# Patient Record
Sex: FEMALE | Race: WHITE | NOT HISPANIC OR LATINO | URBAN - METROPOLITAN AREA
[De-identification: names, ages, dates, MRNs, and addresses within clinical notes are randomized per-mention and may not be internally consistent; named-entity substitution may affect disease eponyms.]

---

## 2017-01-23 ENCOUNTER — APPOINTMENT (RX ONLY)
Dept: URBAN - METROPOLITAN AREA CLINIC 343 | Facility: CLINIC | Age: 62
Setting detail: DERMATOLOGY
End: 2017-01-23

## 2017-01-23 DIAGNOSIS — L57.0 ACTINIC KERATOSIS: ICD-10-CM

## 2017-01-23 DIAGNOSIS — L81.4 OTHER MELANIN HYPERPIGMENTATION: ICD-10-CM

## 2017-01-23 PROBLEM — H91.90 UNSPECIFIED HEARING LOSS, UNSPECIFIED EAR: Status: ACTIVE | Noted: 2017-01-23

## 2017-01-23 PROBLEM — Z85.820 PERSONAL HISTORY OF MALIGNANT MELANOMA OF SKIN: Status: ACTIVE | Noted: 2017-01-23

## 2017-01-23 PROBLEM — M12.9 ARTHROPATHY, UNSPECIFIED: Status: ACTIVE | Noted: 2017-01-23

## 2017-01-23 PROCEDURE — 99202 OFFICE O/P NEW SF 15 MIN: CPT | Mod: 25

## 2017-01-23 PROCEDURE — 17000 DESTRUCT PREMALG LESION: CPT

## 2017-01-23 PROCEDURE — ? COUNSELING

## 2017-01-23 PROCEDURE — ? LIQUID NITROGEN

## 2017-01-23 ASSESSMENT — LOCATION DETAILED DESCRIPTION DERM
LOCATION DETAILED: LEFT DISTAL CALF
LOCATION DETAILED: RIGHT PROXIMAL PRETIBIAL REGION
LOCATION DETAILED: LEFT CENTRAL MALAR CHEEK

## 2017-01-23 ASSESSMENT — LOCATION ZONE DERM
LOCATION ZONE: LEG
LOCATION ZONE: FACE

## 2017-01-23 ASSESSMENT — LOCATION SIMPLE DESCRIPTION DERM
LOCATION SIMPLE: LEFT CALF
LOCATION SIMPLE: LEFT CHEEK
LOCATION SIMPLE: RIGHT PRETIBIAL REGION

## 2017-01-23 NOTE — PROCEDURE: LIQUID NITROGEN
Post-Care Instructions: I reviewed with the patient in detail post-care instructions. Patient is to wear sunprotection, and avoid picking at any of the treated lesions. Pt may apply Vaseline to crusted or scabbing areas.
Detail Level: Detailed
Duration Of Freeze Thaw-Cycle (Seconds): 5
Number Of Freeze-Thaw Cycles: 2 freeze-thaw cycles
Render Post-Care Instructions In Note?: no
Consent: The patient's consent was obtained including but not limited to risks of crusting, scabbing, blistering, scarring, darker or lighter pigmentary change, recurrence, incomplete removal and infection.

## 2018-10-31 ENCOUNTER — NEW PATIENT COMPREHENSIVE (OUTPATIENT)
Dept: URBAN - METROPOLITAN AREA CLINIC 43 | Facility: CLINIC | Age: 63
End: 2018-10-31

## 2018-10-31 DIAGNOSIS — H25.812: ICD-10-CM

## 2018-10-31 DIAGNOSIS — H25.811: ICD-10-CM

## 2018-10-31 PROCEDURE — 92015 DETERMINE REFRACTIVE STATE: CPT

## 2018-10-31 PROCEDURE — 92004 COMPRE OPH EXAM NEW PT 1/>: CPT

## 2018-10-31 ASSESSMENT — VISUAL ACUITY
OS_SC: J3
OD_SC: 20/50-2
OU_SC: J2
OS_SC: 20/25-1
OD_SC: J3
OU_SC: 20/25

## 2018-10-31 ASSESSMENT — TONOMETRY
OS_IOP_MMHG: 13
OD_IOP_MMHG: 13

## 2019-11-12 ENCOUNTER — ESTABLISHED COMPREHENSIVE EXAM (OUTPATIENT)
Dept: URBAN - METROPOLITAN AREA CLINIC 44 | Facility: CLINIC | Age: 64
End: 2019-11-12

## 2019-11-12 DIAGNOSIS — M32.10: ICD-10-CM

## 2019-11-12 DIAGNOSIS — H52.203: ICD-10-CM

## 2019-11-12 DIAGNOSIS — H25.811: ICD-10-CM

## 2019-11-12 DIAGNOSIS — H25.812: ICD-10-CM

## 2019-11-12 DIAGNOSIS — H52.03: ICD-10-CM

## 2019-11-12 DIAGNOSIS — H52.4: ICD-10-CM

## 2019-11-12 PROCEDURE — 92015 DETERMINE REFRACTIVE STATE: CPT

## 2019-11-12 PROCEDURE — 92014 COMPRE OPH EXAM EST PT 1/>: CPT

## 2019-11-12 ASSESSMENT — VISUAL ACUITY
OD_SC: 20/30-1
OS_SC: J7
OD_SC: J7
OS_SC: 20/40+2

## 2019-11-12 ASSESSMENT — TONOMETRY
OS_IOP_MMHG: 13
OD_IOP_MMHG: 13

## 2020-11-19 ENCOUNTER — ESTABLISHED COMPREHENSIVE EXAM (OUTPATIENT)
Dept: URBAN - METROPOLITAN AREA CLINIC 43 | Facility: CLINIC | Age: 65
End: 2020-11-19

## 2020-11-19 DIAGNOSIS — H04.123: ICD-10-CM

## 2020-11-19 DIAGNOSIS — M32.10: ICD-10-CM

## 2020-11-19 DIAGNOSIS — H52.03: ICD-10-CM

## 2020-11-19 DIAGNOSIS — H52.203: ICD-10-CM

## 2020-11-19 DIAGNOSIS — H25.811: ICD-10-CM

## 2020-11-19 DIAGNOSIS — H25.812: ICD-10-CM

## 2020-11-19 PROCEDURE — 92014 COMPRE OPH EXAM EST PT 1/>: CPT

## 2020-11-19 PROCEDURE — 92015 DETERMINE REFRACTIVE STATE: CPT

## 2020-11-19 ASSESSMENT — VISUAL ACUITY
OD_SC: J12
OD_CC: J2
OD_SC: 20/40-1+2
OS_CC: J2
OS_SC: 20/30-2
OS_SC: J10

## 2020-11-19 ASSESSMENT — TONOMETRY
OD_IOP_MMHG: 14
OS_IOP_MMHG: 15

## 2021-11-19 ENCOUNTER — ESTABLISHED COMPREHENSIVE EXAM (OUTPATIENT)
Dept: URBAN - METROPOLITAN AREA CLINIC 43 | Facility: CLINIC | Age: 66
End: 2021-11-19